# Patient Record
Sex: MALE | Race: BLACK OR AFRICAN AMERICAN | NOT HISPANIC OR LATINO | Employment: FULL TIME | URBAN - METROPOLITAN AREA
[De-identification: names, ages, dates, MRNs, and addresses within clinical notes are randomized per-mention and may not be internally consistent; named-entity substitution may affect disease eponyms.]

---

## 2017-05-31 ENCOUNTER — OFFICE VISIT (OUTPATIENT)
Dept: FAMILY MEDICINE | Facility: CLINIC | Age: 32
End: 2017-05-31
Payer: MEDICAID

## 2017-05-31 VITALS
BODY MASS INDEX: 24.01 KG/M2 | WEIGHT: 153 LBS | SYSTOLIC BLOOD PRESSURE: 108 MMHG | TEMPERATURE: 98 F | DIASTOLIC BLOOD PRESSURE: 68 MMHG | RESPIRATION RATE: 14 BRPM | HEIGHT: 67 IN | HEART RATE: 91 BPM | OXYGEN SATURATION: 99 %

## 2017-05-31 DIAGNOSIS — F43.23 ADJUSTMENT DISORDER WITH MIXED ANXIETY AND DEPRESSED MOOD: Primary | ICD-10-CM

## 2017-05-31 PROCEDURE — 99214 OFFICE O/P EST MOD 30 MIN: CPT | Performed by: PHYSICIAN ASSISTANT

## 2017-05-31 RX ORDER — HYDROXYZINE HYDROCHLORIDE 10 MG/1
10-30 TABLET, FILM COATED ORAL 3 TIMES DAILY PRN
Qty: 90 TABLET | Refills: 0 | Status: SHIPPED | OUTPATIENT
Start: 2017-05-31 | End: 2017-06-17

## 2017-05-31 ASSESSMENT — ANXIETY QUESTIONNAIRES
3. WORRYING TOO MUCH ABOUT DIFFERENT THINGS: NEARLY EVERY DAY
7. FEELING AFRAID AS IF SOMETHING AWFUL MIGHT HAPPEN: MORE THAN HALF THE DAYS
5. BEING SO RESTLESS THAT IT IS HARD TO SIT STILL: NOT AT ALL
6. BECOMING EASILY ANNOYED OR IRRITABLE: NOT AT ALL
1. FEELING NERVOUS, ANXIOUS, OR ON EDGE: NEARLY EVERY DAY
GAD7 TOTAL SCORE: 14
2. NOT BEING ABLE TO STOP OR CONTROL WORRYING: NEARLY EVERY DAY
IF YOU CHECKED OFF ANY PROBLEMS ON THIS QUESTIONNAIRE, HOW DIFFICULT HAVE THESE PROBLEMS MADE IT FOR YOU TO DO YOUR WORK, TAKE CARE OF THINGS AT HOME, OR GET ALONG WITH OTHER PEOPLE: EXTREMELY DIFFICULT

## 2017-05-31 ASSESSMENT — PATIENT HEALTH QUESTIONNAIRE - PHQ9: 5. POOR APPETITE OR OVEREATING: NEARLY EVERY DAY

## 2017-05-31 NOTE — NURSING NOTE
"Chief Complaint   Patient presents with     Depression     Anxiety       Initial /68 (BP Location: Right arm, Patient Position: Chair, Cuff Size: Adult Regular)  Pulse 91  Temp 98  F (36.7  C) (Tympanic)  Resp 14  Ht 5' 7\" (1.702 m)  Wt 153 lb (69.4 kg)  SpO2 99%  BMI 23.96 kg/m2 Estimated body mass index is 23.96 kg/(m^2) as calculated from the following:    Height as of this encounter: 5' 7\" (1.702 m).    Weight as of this encounter: 153 lb (69.4 kg).  Medication Reconciliation: complete    "

## 2017-05-31 NOTE — MR AVS SNAPSHOT
"              After Visit Summary   2017    Mathew Rahman    MRN: [id number]           Patient Information     Date Of Birth          1985        Visit Information        Provider Department      2017 2:50 PM Siegler, Nicole Joy, PA-C [facility]        Today's Diagnoses     Adjustment disorder with mixed anxiety and depressed mood    -  1       Follow-ups after your visit        Who to contact     If you have questions or need follow up information about today's clinic visit or your schedule please contact [facility] directly at [phone].  Normal or non-critical lab and imaging results will be communicated to you by Asthmatxhart, letter or phone within 4 business days after the clinic has received the results. If you do not hear from us within 7 days, please contact the clinic through Asthmatxhart or phone. If you have a critical or abnormal lab result, we will notify you by phone as soon as possible.  Submit refill requests through [employer] or call your pharmacy and they will forward the refill request to us. Please allow 3 business days for your refill to be completed.          Additional Information About Your Visit        MyChart Information     [employer] lets you send messages to your doctor, view your test results, renew your prescriptions, schedule appointments and more. To sign up, go to [email or web address] . Click on \"Log in\" on the left side of the screen, which will take you to the Welcome page. Then click on \"Sign up Now\" on the right side of the page.     You will be asked to enter the access code listed below, as well as some personal information. Please follow the directions to create your username and password.     Your access code is: [email or web address]  Expires: 2017  3:18 PM     Your access code will  in 90 days. If you need help or a new code, please call your [facility] or [phone].        Care EveryWhere ID " "    This is your Care EveryWhere ID. This could be used by other organizations to access your [place] medical records  [id number]        Your Vitals Were     Pulse Temperature Respirations Height Pulse Oximetry BMI (Body Mass Index)    91 98  F (36.7  C) (Tympanic) 14 5' 7\" (1.702 m) 99% 23.96 kg/m2       Blood Pressure from Last 3 Encounters:   05/31/17 108/68   12/28/14 104/64   12/28/14 109/70    Weight from Last 3 Encounters:   05/31/17 153 lb (69.4 kg)   12/28/14 159 lb (72.1 kg)   12/28/14 160 lb (72.6 kg)              Today, you had the following     No orders found for display         Today's Medication Changes          These changes are accurate as of: 5/31/17  3:18 PM.  If you have any questions, ask your nurse or doctor.               Start taking these medicines.        Dose/Directions    hydrOXYzine 10 MG tablet   Commonly known as:  ATARAX   Used for:  Adjustment disorder with mixed anxiety and depressed mood   Started by:  Siegler, Nicole Joy, PA-C        Dose:  10-30 mg   Take 1-3 tablets (10-30 mg) by mouth 3 times daily as needed for anxiety   Quantity:  90 tablet   Refills:  0            Where to get your medicines      These medications were sent to [facility] [id number] [place] [address] [address] AT [facility] [address]  [address] [facility] [address]     Phone:  [phone]     hydrOXYzine 10 MG tablet                Primary Care Provider    None       No address on file        Thank you!     Thank you for choosing [facility]  for your care. Our goal is always to provide you with excellent care. Hearing back from our patients is one way we can continue to improve our services. Please take a few minutes to complete the written survey that you may receive in the mail after your visit with us. Thank you!             Your Updated Medication List - Protect others around you: Learn how to safely use, store and throw away your medicines at " www.disposemymeds.org.          This list is accurate as of: 5/31/17  3:18 PM.  Always use your most recent med list.                   Brand Name Dispense Instructions for use    hydrOXYzine 10 MG tablet    ATARAX    90 tablet    Take 1-3 tablets (10-30 mg) by mouth 3 times daily as needed for anxiety

## 2017-05-31 NOTE — PROGRESS NOTES
"  SUBJECTIVE:                                                    Mathew Rahman is a 31 year old male who presents to clinic today for the following health issues:    Abnormal Mood Symptoms     Onset: 1-2 weeks    Description:   Depression: YES  Anxiety: YES    Accompanying Signs & Symptoms:  Still participating in activities that you used to enjoy: no  Fatigue: YES  Irritability: no  Difficulty concentrating: YES  Changes in appetite: YES- barely eating  Problems with sleep: no  Heart racing/beating fast : YES- \"hurts\"  Thoughts of hurting yourself or others: none     History:   Recent stress: no-patient states that he does take care of his entire family  Prior depression hospitalization: None  Family history of depression: no  Family history of anxiety: no      Precipitating factors:   Alcohol/drug use: no    Alleviating factors:  n/a       Therapies Tried and outcome: None    As above- pt describes a few weeks of worsening anxiety symptoms, depressed mood and stress from family concerns stemming from his sister leaving the family and moving away without word to them 6 months ago. He has since heard from her and known she is safe. He has not discussed this or any other concerns with any member of his family or any friends. He is [employer] and began ramadan fasting this week. He lives in a traditional [employer] family where he, as an adult male, cares for his parents and female siblings until they are . His culture does not allow for open discussion of his emotions with his female siblings or his mother. He feels much stress about this. His symptoms have started to take on a physical nature as well with some sweating and fast heartbeats when he feels very nervous. This has not happened to him in the past.     He has no known medical issues; has been basically healthy thru adulthood.   He does not smoke, does not drink alcohol   He works full time in a relatively stressful job.   No new medications, no use of " "recreational drugs.     Problem list and histories reviewed & adjusted, as indicated.  Additional history: as documented    Patient Active Problem List   Diagnosis     Polyuria     Shortness of breath on exertion     Fatigue     CARDIOVASCULAR SCREENING; LDL GOAL LESS THAN 160     Immunity status testing     Vitamin B12 deficiency without anemia     Vitamin D deficiency     Nasal congestion     Impacted cerumen     Allergic rhinosinusitis     Hyperlipidemia LDL goal <160     History reviewed. No pertinent surgical history.    Social History   Substance Use Topics     Smoking status: Never Smoker     Smokeless tobacco: Never Used     Alcohol use No     Family History   Problem Relation Age of Onset     DIABETES Paternal Grandfather          Current Outpatient Prescriptions   Medication Sig Dispense Refill     hydrOXYzine (ATARAX) 10 MG tablet Take 1-3 tablets (10-30 mg) by mouth 3 times daily as needed for anxiety 90 tablet 0       Reviewed and updated as needed this visit by clinical staff       Reviewed and updated as needed this visit by Provider         ROS:  Constitutional, HEENT, cardiovascular, pulmonary, gi and gu systems are negative, except as otherwise noted.    OBJECTIVE:                                                    /68 (BP Location: Right arm, Patient Position: Chair, Cuff Size: Adult Regular)  Pulse 91  Temp 98  F (36.7  C) (Tympanic)  Resp 14  Ht 5' 7\" (1.702 m)  Wt 153 lb (69.4 kg)  SpO2 99%  BMI 23.96 kg/m2  Body mass index is 23.96 kg/(m^2).   RESP: No labored breathing  SKIN: No diaphoresis, redness, rash or pallor.   Mental Status Exam  Behavior: cooperative and calm  Speech: normal rate and rhythm  Mood: anxious and labile- rotates between anxious and calm quickly  Affect: Tearful, Reactive/Full range and Anxious/Nervous  Thought Processes: Goal directed  Thought Content: no evidence of suicidal or homicidal ideation, no overt psychosis and patient does not appear to be " "responding to internal stimuli  Insight: Fair  Judgment: Adequate for safety      Diagnostic Test Results:  none      ASSESSMENT/PLAN:                                                        ICD-10-CM    1. Adjustment disorder with mixed anxiety and depressed mood F43.23 hydrOXYzine (ATARAX) 10 MG tablet       We discussed the following  - daily medication use; it may be too soon to start him on this as his symptoms seem to have been new onset. During our discussion he shares that the symptoms have been insidious in onset over the past few months and he feels he is at his \"peak\" right now. We discussed daily meds in that they may take 4-8 weeks for full effect and should have a plan to remain on them for 9-12 months to prevent recurrent symptoms. He is not prepared to make that commitment.   - use of hydroxyzine and appropriate dosing of 10-30 mg to find a helpful reduction of anxiety symptoms without overwhelming sleepiness. He agrees to try this and will let me know if there are any concerns or side effects.   - the importance of therapy whether professionally or with a culturally appropriate person. I recommended a male family member, a  or other person he would be comfortable speaking with. His symptoms may be stemming from his culturally significant need to be the \"strong\" person without having anyone to process his emotions with. I agreed that it is not always appropriate to share his emotions with everyone, but everyone should have someone who they can process with and that is sometimes another adult family member or  or family friend.  - if symptoms persist we should do a physical examination and lab testing for other etiologies.      He expressed understanding and agreement with the plan. He will break his ramadan fast to take this medication with a small sip of water over the next few weeks and return as needed for follow up. He will call for referral to therapy/counseling " services should he decide he would like them.     25 total minutes spent with the patient, > 50% face to face discussing the patients concerns and addressing questions in the above assessment and plan.          Nicole Joy Siegler, PA-C  [facility]

## 2017-06-01 ASSESSMENT — PATIENT HEALTH QUESTIONNAIRE - PHQ9: SUM OF ALL RESPONSES TO PHQ QUESTIONS 1-9: 13

## 2017-06-01 ASSESSMENT — ANXIETY QUESTIONNAIRES: GAD7 TOTAL SCORE: 14

## 2017-06-17 ENCOUNTER — APPOINTMENT (OUTPATIENT)
Dept: CT IMAGING | Facility: CLINIC | Age: 32
End: 2017-06-17
Attending: EMERGENCY MEDICINE
Payer: MEDICAID

## 2017-06-17 ENCOUNTER — OFFICE VISIT (OUTPATIENT)
Dept: FAMILY MEDICINE | Facility: CLINIC | Age: 32
End: 2017-06-17
Payer: MEDICAID

## 2017-06-17 ENCOUNTER — HOSPITAL ENCOUNTER (EMERGENCY)
Facility: CLINIC | Age: 32
Discharge: HOME | End: 2017-06-17
Attending: EMERGENCY MEDICINE | Admitting: EMERGENCY MEDICINE
Payer: MEDICAID

## 2017-06-17 VITALS
SYSTOLIC BLOOD PRESSURE: 120 MMHG | BODY MASS INDEX: 23.7 KG/M2 | WEIGHT: 151 LBS | HEIGHT: 67 IN | DIASTOLIC BLOOD PRESSURE: 70 MMHG | HEART RATE: 82 BPM | RESPIRATION RATE: 16 BRPM | TEMPERATURE: 97 F

## 2017-06-17 VITALS
RESPIRATION RATE: 16 BRPM | SYSTOLIC BLOOD PRESSURE: 138 MMHG | HEART RATE: 65 BPM | TEMPERATURE: 97.8 F | DIASTOLIC BLOOD PRESSURE: 83 MMHG | OXYGEN SATURATION: 99 %

## 2017-06-17 DIAGNOSIS — F41.9 ANXIETY: ICD-10-CM

## 2017-06-17 DIAGNOSIS — R10.9 FLANK PAIN: ICD-10-CM

## 2017-06-17 DIAGNOSIS — F41.8 DEPRESSION WITH ANXIETY: Primary | ICD-10-CM

## 2017-06-17 DIAGNOSIS — R51.9 ACUTE NONINTRACTABLE HEADACHE, UNSPECIFIED HEADACHE TYPE: ICD-10-CM

## 2017-06-17 LAB
ALBUMIN SERPL-MCNC: 4.3 G/DL (ref 3.4–5)
ALBUMIN UR-MCNC: NEGATIVE MG/DL
ALP SERPL-CCNC: 89 U/L (ref 40–150)
ALT SERPL W P-5'-P-CCNC: 39 U/L (ref 0–70)
ANION GAP SERPL CALCULATED.3IONS-SCNC: 9 MMOL/L (ref 3–14)
APPEARANCE UR: CLEAR
AST SERPL W P-5'-P-CCNC: 38 U/L (ref 0–45)
BILIRUB SERPL-MCNC: 0.8 MG/DL (ref 0.2–1.3)
BILIRUB UR QL STRIP: NEGATIVE
BUN SERPL-MCNC: 12 MG/DL (ref 7–30)
CALCIUM SERPL-MCNC: 9.4 MG/DL (ref 8.5–10.1)
CHLORIDE SERPL-SCNC: 106 MMOL/L (ref 94–109)
CO2 SERPL-SCNC: 25 MMOL/L (ref 20–32)
COLOR UR AUTO: YELLOW
CREAT SERPL-MCNC: 0.88 MG/DL (ref 0.66–1.25)
GFR SERPL CREATININE-BSD FRML MDRD: ABNORMAL ML/MIN/1.7M2
GLUCOSE SERPL-MCNC: 109 MG/DL (ref 70–99)
GLUCOSE UR STRIP-MCNC: NEGATIVE MG/DL
HGB UR QL STRIP: NEGATIVE
KETONES UR STRIP-MCNC: NEGATIVE MG/DL
LEUKOCYTE ESTERASE UR QL STRIP: NEGATIVE
NITRATE UR QL: NEGATIVE
NON-SQ EPI CELLS #/AREA URNS LPF: NORMAL /LPF
PH UR STRIP: 5.5 PH (ref 5–7)
POTASSIUM SERPL-SCNC: 4 MMOL/L (ref 3.4–5.3)
PROT SERPL-MCNC: 7.9 G/DL (ref 6.8–8.8)
RBC #/AREA URNS AUTO: NORMAL /HPF (ref 0–2)
SODIUM SERPL-SCNC: 140 MMOL/L (ref 133–144)
SP GR UR STRIP: >1.03 (ref 1–1.03)
URN SPEC COLLECT METH UR: NORMAL
UROBILINOGEN UR STRIP-ACNC: 1 EU/DL (ref 0.2–1)
WBC #/AREA URNS AUTO: NORMAL /HPF (ref 0–2)

## 2017-06-17 PROCEDURE — 99214 OFFICE O/P EST MOD 30 MIN: CPT | Performed by: FAMILY MEDICINE

## 2017-06-17 PROCEDURE — 81001 URINALYSIS AUTO W/SCOPE: CPT | Performed by: FAMILY MEDICINE

## 2017-06-17 PROCEDURE — 70450 CT HEAD/BRAIN W/O DYE: CPT

## 2017-06-17 PROCEDURE — 36415 COLL VENOUS BLD VENIPUNCTURE: CPT | Performed by: FAMILY MEDICINE

## 2017-06-17 PROCEDURE — 80053 COMPREHEN METABOLIC PANEL: CPT | Performed by: FAMILY MEDICINE

## 2017-06-17 PROCEDURE — 99284 EMERGENCY DEPT VISIT MOD MDM: CPT | Mod: 25

## 2017-06-17 ASSESSMENT — ANXIETY QUESTIONNAIRES
6. BECOMING EASILY ANNOYED OR IRRITABLE: NEARLY EVERY DAY
3. WORRYING TOO MUCH ABOUT DIFFERENT THINGS: NEARLY EVERY DAY
GAD7 TOTAL SCORE: 19
IF YOU CHECKED OFF ANY PROBLEMS ON THIS QUESTIONNAIRE, HOW DIFFICULT HAVE THESE PROBLEMS MADE IT FOR YOU TO DO YOUR WORK, TAKE CARE OF THINGS AT HOME, OR GET ALONG WITH OTHER PEOPLE: VERY DIFFICULT
5. BEING SO RESTLESS THAT IT IS HARD TO SIT STILL: SEVERAL DAYS
1. FEELING NERVOUS, ANXIOUS, OR ON EDGE: NEARLY EVERY DAY
2. NOT BEING ABLE TO STOP OR CONTROL WORRYING: NEARLY EVERY DAY
7. FEELING AFRAID AS IF SOMETHING AWFUL MIGHT HAPPEN: NEARLY EVERY DAY

## 2017-06-17 ASSESSMENT — PATIENT HEALTH QUESTIONNAIRE - PHQ9: 5. POOR APPETITE OR OVEREATING: NEARLY EVERY DAY

## 2017-06-17 NOTE — MR AVS SNAPSHOT
"              After Visit Summary   6/17/2017    Mathew Rahman    MRN: [id number]           Patient Information     Date Of Birth          1985        Visit Information        Provider Department      6/17/2017 8:00 AM Ankit Wang MD [facility]        Today's Diagnoses     Depression with anxiety    -  1    Flank pain          Care Instructions    Work to do relaxation techniques            Follow-ups after your visit        Follow-up notes from your care team     Return in about 2 weeks (around 7/1/2017).      Who to contact     If you have questions or need follow up information about today's clinic visit or your schedule please contact [facility] directly at [phone].  Normal or non-critical lab and imaging results will be communicated to you by angelMDhart, letter or phone within 4 business days after the clinic has received the results. If you do not hear from us within 7 days, please contact the clinic through angelMDhart or phone. If you have a critical or abnormal lab result, we will notify you by phone as soon as possible.  Submit refill requests through [employer] or call your pharmacy and they will forward the refill request to us. Please allow 3 business days for your refill to be completed.          Additional Information About Your Visit        angelMDhart Information     [employer] lets you send messages to your doctor, view your test results, renew your prescriptions, schedule appointments and more. To sign up, go to [email or web address] . Click on \"Log in\" on the left side of the screen, which will take you to the Welcome page. Then click on \"Sign up Now\" on the right side of the page.     You will be asked to enter the access code listed below, as well as some personal information. Please follow the directions to create your username and password.     Your access code is: [email or web address]  Expires: 8/29/2017  3:18 PM     Your access code " "will  in 90 days. If you need help or a new code, please call your [place] clinic or [phone].        Care EveryWhere ID     This is your Care EveryWhere ID. This could be used by other organizations to access your [place] medical records  [id number]        Your Vitals Were     Pulse Temperature Respirations Height BMI (Body Mass Index)       82 97  F (36.1  C) (Tympanic) 16 5' 7\" (1.702 m) 23.65 kg/m2        Blood Pressure from Last 3 Encounters:   17 120/70   17 108/68   14 104/64    Weight from Last 3 Encounters:   17 151 lb (68.5 kg)   17 153 lb (69.4 kg)   14 159 lb (72.1 kg)              We Performed the Following     Comprehensive metabolic panel (BMP + Alb, Alk Phos, ALT, AST, Total. Bili, TP)     UA with Microscopic          Today's Medication Changes          These changes are accurate as of: 17  8:12 AM.  If you have any questions, ask your nurse or doctor.               Stop taking these medicines if you haven't already. Please contact your care team if you have questions.     hydrOXYzine 10 MG tablet   Commonly known as:  ATARAX   Stopped by:  Ankit Wang MD                    Primary Care Provider    None       No address on file        Thank you!     Thank you for choosing [facility]  for your care. Our goal is always to provide you with excellent care. Hearing back from our patients is one way we can continue to improve our services. Please take a few minutes to complete the written survey that you may receive in the mail after your visit with us. Thank you!             Your Updated Medication List - Protect others around you: Learn how to safely use, store and throw away your medicines at www.disposemymeds.org.      Notice  As of 2017  8:12 AM    You have not been prescribed any medications.      "

## 2017-06-17 NOTE — PROGRESS NOTES
"  SUBJECTIVE:                                                    Mathew Rahman is a 31 year old male who presents to clinic today for the following health issues:        Depression and Anxiety Follow-Up    Status since last visit: Worsened     Other associated symptoms: flank pain, malaise    Complicating factors:     Significant life event: No     Current substance abuse: None    PHQ-9 SCORE 5/31/2017   Total Score 13     KENTRELL-7 SCORE 5/31/2017   Total Score 14        PHQ-9  English      PHQ-9   Any Language     GAD7   He took the Hydroxyzine for a week, thought that it helped at first but then felt worse so he stopped taking medication.  He is leery of taking anything else at this time    Ramadan is continuing, lasts until next Saturday      Amount of exercise or physical activity: 2-3 days/week for an average of 15-30 minutes- just started this week    Problems taking medications regularly: No    Medication side effects: feels hydroxyzine made him feel worse    Diet: regular (no restrictions)          Problem list and histories reviewed & adjusted, as indicated.  Additional history: as documented    Labs reviewed in EPIC    Reviewed and updated as needed this visit by clinical staff       Reviewed and updated as needed this visit by Provider         ROS:  CONSTITUTIONAL:NEGATIVE for fever, chills, change in weight  RESP:NEGATIVE for significant cough or SOB  CV: NEGATIVE for chest pain, palpitations or peripheral edema  GI: POSITIVE for nausea  MUSCULOSKELETAL: POSITIVE  for back pain mid back, no CVA tenderness  PSYCHIATRIC: POSITIVE foranxiety and depressed mood    OBJECTIVE:                                                    /70  Pulse 82  Temp 97  F (36.1  C) (Tympanic)  Resp 16  Ht 5' 7\" (1.702 m)  Wt 151 lb (68.5 kg)  BMI 23.65 kg/m2  Body mass index is 23.65 kg/(m^2).  GENERAL APPEARANCE: healthy, alert and no distress  RESP: lungs clear to auscultation - no rales, rhonchi or wheezes  CV: " regular rates and rhythm, normal S1 S2, no S3 or S4 and no murmur, click or rub  ABDOMEN: soft, nontender, without hepatosplenomegaly or masses and bowel sounds normal  MS: extremities normal- no gross deformities noted and no CVA tenderness but paraspinal muscles are tight  PSYCH: mentation appears normal, affect flat and anxious    Diagnostic test results:  Lab: see below, results pending     ASSESSMENT/PLAN:                                                        ICD-10-CM    1. Depression with anxiety F41.8    2. Flank pain R10.9 Comprehensive metabolic panel (BMP + Alb, Alk Phos, ALT, AST, Total. Bili, TP)     UA with Microscopic       Follow up with Provider - 2-3 weeks if not improving  Pt does not want to start other medication at this time.  Discussed referring to mental health for counseling.  He does not want to do that at this time.  When Ramadan is completed he will be able to get back to regular schedule for eating and drinking fluids   Patient Instructions   Work to do relaxation techniques        Ankit Wang MD  [facility]

## 2017-06-17 NOTE — NURSING NOTE
"Chief Complaint   Patient presents with     Anxiety     Depression       Initial /70  Pulse 82  Temp 97  F (36.1  C) (Tympanic)  Resp 16  Ht 5' 7\" (1.702 m)  Wt 151 lb (68.5 kg)  BMI 23.65 kg/m2 Estimated body mass index is 23.65 kg/(m^2) as calculated from the following:    Height as of this encounter: 5' 7\" (1.702 m).    Weight as of this encounter: 151 lb (68.5 kg).  Medication Reconciliation: complete     Ernestina Bonner CMA      "

## 2017-06-17 NOTE — ED AVS SNAPSHOT
[facility] Emergency Department    [address]    [facility] [address]    Phone:  [phone]    Fax:  [phone]                                       Mathew Rahman   MRN: [id number]    Department:  [facility] Emergency Department   Date of Visit:  6/17/2017           After Visit Summary Signature Page     I have received my discharge instructions, and my questions have been answered. I have discussed any challenges I see with this plan with the nurse or doctor.    ..........................................................................................................................................  Patient/Patient Representative Signature      ..........................................................................................................................................  Patient Representative Print Name and Relationship to Patient    ..................................................               ................................................  Date                                            Time    ..........................................................................................................................................  Reviewed by Signature/Title    ...................................................              ..............................................  Date                                                            Time

## 2017-06-17 NOTE — LETTER
[facility]  [address]  [address]  [facility] [id number] [phone]                                                                                                           Mathew Rahman  [address]  [facility] [address]    June 19, 2017      Dear Mathew,    The results of your recent tests were reviewed and are enclosed.     Metabolic panel shows mild elevation of glucose, not significant.  Keep carbohydrate intake controlled  Urine test is normal  Results for orders placed or performed in visit on 06/17/17   Comprehensive metabolic panel (BMP + Alb, Alk Phos, ALT, AST, Total. Bili, TP)   Result Value Ref Range    Sodium 140 133 - 144 mmol/L    Potassium 4.0 3.4 - 5.3 mmol/L    Chloride 106 94 - 109 mmol/L    Carbon Dioxide 25 20 - 32 mmol/L    Anion Gap 9 3 - 14 mmol/L    Glucose 109 (H) 70 - 99 mg/dL    Urea Nitrogen 12 7 - 30 mg/dL    Creatinine 0.88 0.66 - 1.25 mg/dL    GFR Estimate >90  Non  GFR Calc   >60 mL/min/1.7m2    GFR Estimate If Black >90   GFR Calc   >60 mL/min/1.7m2    Calcium 9.4 8.5 - 10.1 mg/dL    Bilirubin Total 0.8 0.2 - 1.3 mg/dL    Albumin 4.3 3.4 - 5.0 g/dL    Protein Total 7.9 6.8 - 8.8 g/dL    Alkaline Phosphatase 89 40 - 150 U/L    ALT 39 0 - 70 U/L    AST 38 0 - 45 U/L   UA with Microscopic   Result Value Ref Range    Color Urine Yellow     Appearance Urine Clear     Glucose Urine Negative NEG mg/dL    Bilirubin Urine Negative NEG    Ketones Urine Negative NEG mg/dL    Specific Gravity Urine >1.030 1.003 - 1.035    pH Urine 5.5 5.0 - 7.0 pH    Protein Albumin Urine Negative NEG mg/dL    Urobilinogen Urine 1.0 0.2 - 1.0 EU/dL    Nitrite Urine Negative NEG    Blood Urine Negative NEG    Leukocyte Esterase Urine Negative NEG    Source Midstream Urine     WBC Urine O - 2 0 - 2 /HPF    RBC Urine O - 2 0 - 2 /HPF    Squamous Epithelial /LPF Urine Few FEW /LPF         Thank you for choosing  [facility].  We appreciate the opportunity to serve you and look forward to supporting your healthcare needs in the future.    If you have any questions or concerns, please call me or my staff at [phone].      Sincerely,    Ankit Wang MD

## 2017-06-17 NOTE — ED AVS SNAPSHOT
[facility] Emergency Department    [address] [place] [address]    Phone:  [phone]    Fax:  [phone]                                       Mathew Rahman   MRN: [id number]    Department:  [facility] Emergency Department   Date of Visit:  6/17/2017           Patient Information     Date Of Birth          1985        Your diagnoses for this visit were:     Acute nonintractable headache, unspecified headache type     Anxiety        You were seen by Concepción Gutierrez MD.      Follow-up Information     Follow up with In clinic In 1 week.    Why:  As needed        Discharge Instructions       Start taking melatonin 30 minutes before bed.  Doses between 1-5mg are common - frequently people start with 3mg.    Discharge Instructions  Headache    You were seen today for a headache. Headaches may be caused by many different things such as muscle tension, sinus inflammation, anxiety and stress, having too little sleep, too much alcohol, some medical conditions or injury. You may have a migraine, which is caused by changes in the blood vessels in your head.  At this time your doctor does not find that your headache is a sign of anything dangerous or life-threatening.  However, sometimes the signs of serious illness do not show up right away.  If you have new or worse symptoms, you may need to be seen again in the emergency department or by your primary doctor.      Return to the Emergency Department if:    You get a fever of 101 F or higher.    Your headache gets much worse.    You get a stiff neck with your headache.    You get a new headache that is different or worse than headaches you have had before.    You are vomiting and can t keep food or water down.    You have blurry or double vision or other problems with your eyes.    You have a new weakness on one side of your body.    You have difficulty with balance which is new.    You or your family thinks  you are confused.    You have a seizure or convulsion.    What can I do to help myself?    Pain medications - You may take a pain medication such as Tylenol  (acetaminophen), Advil , Nuprin  (ibuprofen) or Aleve  (naproxen).  If you have been given a narcotic such as Vicodin  (hydrocodone with acetaminophen), Percocet  (oxycodone with acetaminophen), codeine, or a muscle relaxant such as Flexeril  (cyclobenzaprine) or Soma  (carisoprodol), do not drive for four hours after you have taken it. If the narcotic contains Tylenol  (acetaminophen), do not take Tylenol  with it. All narcotics will cause constipation, so eat a high fiber diet.        Take a pain reliever as soon as you notice symptoms.  Starting medications as soon as you start to have symptoms may lessen the amount of pain you have.    Relaxing in a quiet, dark room may help.    Get enough sleep and eat meals regularly.    Schedule an appointment with your primary physician as instructed, or at least within 1 week.    You may need to watch for certain foods or other things which may trigger your headaches.  Keeping a journal of your headaches and possible triggers may help you and your primary doctor to identify things which you should avoid which may be causing your headaches.  If you were given a prescription for medicine here today, be sure to read all of the information (including the package insert) that comes with your prescription.  This will include important information about the medicine, its side effects, and any warnings that you need to know about.  The pharmacist who fills the prescription can provide more information and answer questions you may have about the medicine.  If you have questions or concerns that the pharmacist cannot address, please call or return to the Emergency Department.           24 Hour Appointment Hotline       To make an appointment at any [facility], call [phone] ([phone]). If you don't have a family  doctor or clinic, we will help you find one. [facility] are conveniently located to serve the needs of you and your family.             Review of your medicines      Notice     You have not been prescribed any medications.            Procedures and tests performed during your visit     Head CT w/o contrast      Orders Needing Specimen Collection     None      Pending Results     No orders found from 6/15/2017 to 6/18/2017.            Pending Culture Results     No orders found from 6/15/2017 to 6/18/2017.            Pending Results Instructions     If you had any lab results that were not finalized at the time of your Discharge, you can call the ED Lab Result RN at [phone]. You will be contacted by this team for any positive Lab results or changes in treatment. The nurses are available 7 days a week from 10A to 6:30P.  You can leave a message 24 hours per day and they will return your call.        Test Results From Your Hospital Stay        6/17/2017 11:21 PM      Narrative     CT HEAD W/O CONTRAST 6/17/2017 11:10 PM    HISTORY: Posterior headache.    COMPARISON: None.    TECHNIQUE: Noncontrast head CT.  Radiation dose for this scan was  reduced using automated exposure control, adjustment of the mA and/or  kV according to patient size, or iterative reconstruction technique.    FINDINGS: No intracranial hemorrhage. No abnormal extra-axial fluid  collection. Midline is maintained. Ventricular volumes are normal. No  evidence of mass or infarct. Calvarium is intact. Sinuses and mastoid  air cells are normally aerated.        Impression     IMPRESSION: Normal head CT.    GEOVANNY KO MD                Clinical Quality Measure: Blood Pressure Screening     Your blood pressure was checked while you were in the emergency department today. The last reading we obtained was  BP: 138/83 . Please read the guidelines below about what these numbers mean and what you should do about them.  If your systolic blood  "pressure (the top number) is less than 120 and your diastolic blood pressure (the bottom number) is less than 80, then your blood pressure is normal. There is nothing more that you need to do about it.  If your systolic blood pressure (the top number) is 120-139 or your diastolic blood pressure (the bottom number) is 80-89, your blood pressure may be higher than it should be. You should have your blood pressure rechecked within a year by a primary care provider.  If your systolic blood pressure (the top number) is 140 or greater or your diastolic blood pressure (the bottom number) is 90 or greater, you may have high blood pressure. High blood pressure is treatable, but if left untreated over time it can put you at risk for heart attack, stroke, or kidney failure. You should have your blood pressure rechecked by a primary care provider within the next 4 weeks.  If your provider in the emergency department today gave you specific instructions to follow-up with your doctor or provider even sooner than that, you should follow that instruction and not wait for up to 4 weeks for your follow-up visit.        Thank you for choosing [facility]       Thank you for choosing [facility] for your care. Our goal is always to provide you with excellent care. Hearing back from our patients is one way we can continue to improve our services. Please take a few minutes to complete the written survey that you may receive in the mail after you visit with us. Thank you!        [employer] Information     [employer] lets you send messages to your doctor, view your test results, renew your prescriptions, schedule appointments and more. To sign up, go to [email or web address] . Click on \"Log in\" on the left side of the screen, which will take you to the Welcome page. Then click on \"Sign up Now\" on the right side of the page.     You will be asked to enter the access code listed below, as well as some personal information. Please follow the " directions to create your username and password.     Your access code is: [email or web address]  Expires: 2017  3:18 PM     Your access code will  in 90 days. If you need help or a new code, please call your [place] clinic or [phone].        Care EveryWhere ID     This is your Care EveryWhere ID. This could be used by other organizations to access your [place] medical records  [id number]        After Visit Summary       This is your record. Keep this with you and show to your community pharmacist(s) and doctor(s) at your next visit.

## 2017-06-18 ASSESSMENT — PATIENT HEALTH QUESTIONNAIRE - PHQ9: SUM OF ALL RESPONSES TO PHQ QUESTIONS 1-9: 18

## 2017-06-18 ASSESSMENT — ANXIETY QUESTIONNAIRES: GAD7 TOTAL SCORE: 19

## 2017-06-18 NOTE — DISCHARGE INSTRUCTIONS
Start taking melatonin 30 minutes before bed.  Doses between 1-5mg are common - frequently people start with 3mg.    Discharge Instructions  Headache    You were seen today for a headache. Headaches may be caused by many different things such as muscle tension, sinus inflammation, anxiety and stress, having too little sleep, too much alcohol, some medical conditions or injury. You may have a migraine, which is caused by changes in the blood vessels in your head.  At this time your doctor does not find that your headache is a sign of anything dangerous or life-threatening.  However, sometimes the signs of serious illness do not show up right away.  If you have new or worse symptoms, you may need to be seen again in the emergency department or by your primary doctor.      Return to the Emergency Department if:    You get a fever of 101 F or higher.    Your headache gets much worse.    You get a stiff neck with your headache.    You get a new headache that is different or worse than headaches you have had before.    You are vomiting and can t keep food or water down.    You have blurry or double vision or other problems with your eyes.    You have a new weakness on one side of your body.    You have difficulty with balance which is new.    You or your family thinks you are confused.    You have a seizure or convulsion.    What can I do to help myself?    Pain medications - You may take a pain medication such as Tylenol  (acetaminophen), Advil , Nuprin  (ibuprofen) or Aleve  (naproxen).  If you have been given a narcotic such as Vicodin  (hydrocodone with acetaminophen), Percocet  (oxycodone with acetaminophen), codeine, or a muscle relaxant such as Flexeril  (cyclobenzaprine) or Soma  (carisoprodol), do not drive for four hours after you have taken it. If the narcotic contains Tylenol  (acetaminophen), do not take Tylenol  with it. All narcotics will cause constipation, so eat a high fiber diet.        Take a pain  reliever as soon as you notice symptoms.  Starting medications as soon as you start to have symptoms may lessen the amount of pain you have.    Relaxing in a quiet, dark room may help.    Get enough sleep and eat meals regularly.    Schedule an appointment with your primary physician as instructed, or at least within 1 week.    You may need to watch for certain foods or other things which may trigger your headaches.  Keeping a journal of your headaches and possible triggers may help you and your primary doctor to identify things which you should avoid which may be causing your headaches.  If you were given a prescription for medicine here today, be sure to read all of the information (including the package insert) that comes with your prescription.  This will include important information about the medicine, its side effects, and any warnings that you need to know about.  The pharmacist who fills the prescription can provide more information and answer questions you may have about the medicine.  If you have questions or concerns that the pharmacist cannot address, please call or return to the Emergency Department.

## 2017-06-18 NOTE — ED PROVIDER NOTES
History     Chief Complaint:  Headache; Dizziness; and Anxiety      HPI   Mathew Rahman is a 31 year old male who presents with continued dizziness and anxiety and new onset headache.  First noticed it last night when he couldn't sleep.  Posterior in location.  However this evening it escalated abruptly to 10/10 and wrapped around and was associated with dizziness and blurred vision.  Took aleve at onset of headache but none at time of worsening.  Now 3/10 since resting in room.  Declined offer for medication for symptoms.  No fever, nausea, speech changes, arm/leg weakness.  Was seen in clinic today for ongoing issues with anxiety.  Had tried hydroxyzine but stopped it and did not want to try another medication at this time.  Stopped fasting for Ramadan due to concern of his current symptoms today.  Born in [place] [facility], moved at age 9    Allergies:  No Known Allergies     Medications:    None    Past Medical History:    Fatigue  Hyperlipidemia    Past Surgical History:    History reviewed. No pertinent surgical history.    Family History:    Family History   Problem Relation Age of Onset     DIABETES Paternal Grandfather        Social History:  Marital Status:  Single [1]  Social History   Substance Use Topics     Smoking status: Never Smoker     Smokeless tobacco: Never Used     Alcohol use No        Review of Systems  Ten system ROS reviewed and is negative except as above      Physical Exam   First Vitals:  BP: 138/83  Pulse: 65  Temp: 97.8  F (36.6  C)  Resp: 16  SpO2: 99 %    Physical Exam  General: Cooperative, appears worried  Head:  The scalp, face, and head appear normal and symmetric  Eyes:  Sclera white; PERRL; EOMI w/o nystagmus  ENT:    External ears and nares normal  Neck:  No meningismus or bruit  CV:  Regular rate and rhythm  No murmur   Resp:  Breath sounds clear and equal bilaterally  GI:  Abdomen is soft, non-tender, non-distended  MS:  Moves all extremities  Neuro: Speech is normal and fluent. No  apparent deficit    Strength 5/5 x4.  Sensation intact x4.      Cranial nerves II-XII intact by examination.    Normal gait, tandem, Rhomberg        Emergency Department Course     Imaging Studies:  Head CT w/o contrast   Final Result   IMPRESSION: Normal head CT.      GEOVANNY KO MD            Impression & Plan      Medical Decision Making:  Mathew Rahman is a 31 year old male is here for evaluation of a new headache that acutely worsened severely just prior to arrival.  DDX includes intracranial mass and bleed and other mass lesions such as neurocystercosis.  CT negative.  Given acuity of presentation after most severe aspect of headache and remaining exam, LP will not be pursued.  Recent anxiety and insomnia may be contributing.  He asked about more natural options for treatment.  On chart review he was recently on hydroxyzine.  Recommended exercise and melatonin.  F/u clinic.      Diagnosis:    ICD-10-CM    1. Acute nonintractable headache, unspecified headache type R51    2. Anxiety F41.9        Concepción Gutierrez MD  6/17/2017   [facility] EMERGENCY DEPARTMENT       Concepción Gutierrez MD  06/18/17 0148

## 2017-06-18 NOTE — ED NOTES
"Pt presents to the ER for evaluation of headache, dizziness, and anxiety. He has been dealing with anxiety and depression lately. Pt stopped hydroxyzine last week, which was prescribed for anxiety, because he \"didn't want to get addicted to it.\" Pt started having a headache yesterday, along with dizziness and blurred vision, which has gradually increased in severity. He now rates his pain 10/10. Pt is alert and oriented. ABCDs intact.  "

## 2023-02-20 ENCOUNTER — APPOINTMENT (OUTPATIENT)
Dept: CT IMAGING | Facility: CLINIC | Age: 38
End: 2023-02-20
Attending: FAMILY MEDICINE
Payer: COMMERCIAL

## 2023-02-20 ENCOUNTER — HOSPITAL ENCOUNTER (EMERGENCY)
Facility: CLINIC | Age: 38
Discharge: HOME | End: 2023-02-20
Attending: EMERGENCY MEDICINE | Admitting: EMERGENCY MEDICINE
Payer: COMMERCIAL

## 2023-02-20 VITALS
SYSTOLIC BLOOD PRESSURE: 120 MMHG | HEIGHT: 67 IN | WEIGHT: 164.4 LBS | HEART RATE: 71 BPM | RESPIRATION RATE: 16 BRPM | TEMPERATURE: 98.3 F | OXYGEN SATURATION: 97 % | BODY MASS INDEX: 25.8 KG/M2 | DIASTOLIC BLOOD PRESSURE: 73 MMHG

## 2023-02-20 DIAGNOSIS — S09.90XA HEAD INJURY, INITIAL ENCOUNTER: ICD-10-CM

## 2023-02-20 PROCEDURE — 99282 EMERGENCY DEPT VISIT SF MDM: CPT | Performed by: EMERGENCY MEDICINE

## 2023-02-20 PROCEDURE — 70450 CT HEAD/BRAIN W/O DYE: CPT

## 2023-02-20 PROCEDURE — 99284 EMERGENCY DEPT VISIT MOD MDM: CPT | Mod: 25 | Performed by: EMERGENCY MEDICINE

## 2023-02-20 ASSESSMENT — ACTIVITIES OF DAILY LIVING (ADL): ADLS_ACUITY_SCORE: 33

## 2023-02-20 NOTE — ED PROVIDER NOTES
"    [facility] EMERGENCY DEPARTMENT ([facility])    2/20/23      ED PROVIDER NOTE  Consult Room 4:51 PM   History     Chief Complaint   Patient presents with     Fall     Headache     The history is provided by the patient and medical records.     Mathew Rahman is a 37 year old male who presents for evaluation after mechanical slip and fall with head injury.  Patient slipped on some ice this morning around 9:30 AM, falling backwards, hitting his head on his car and his buttocks on the ground. He was able to get up right away. His vision seemed white and blurry afterwards but then this subsided.  He endorses headache. He had some neck pain immediately but this subsided. No back pain.  No nausea or vomiting. No blurry vision, dizziness, shortness of breath currently.  No focal neurologic complaints.  He did take Tylenol for this headache prior to arrival. No numbness, weakness, tingling in arms or legs. No unilateral weakness. No change in speech. No injuries elsewhere, The Tylenol and tea helped. He drove himself here. No allergies. Not on any medications. He notes phonophobia and even speaking in a loud voice makes his head hurt. He is otherwise healthy.     Past Medical History  Past Medical History:   Diagnosis Date     Fatigue 1/24/2014     No past surgical history on file.  No current outpatient medications on file.    No Known Allergies  Family History  Family History   Problem Relation Age of Onset     Diabetes Paternal Grandfather      Social History   Social History     Tobacco Use     Smoking status: Never     Smokeless tobacco: Never   Substance Use Topics     Alcohol use: No     Drug use: No         A medically appropriate review of systems was performed with pertinent positives and negatives noted in the HPI, and all other systems negative.    Physical Exam   BP: 120/73  Pulse: 71  Temp: 98.3  F (36.8  C)  Resp: 16  Height: 170.2 cm (5' 7\")  Weight: 74.6 kg (164 lb 6.4 oz)  SpO2: 97 %  Physical " Exam  General: Alert, sitting on the bed in NAD  Neuro: Awake alert &O x 3 CN 2-12 intact; no pronator drift present; strength 5/5 at elbow flexion; strength 5/5 at elbow extension with good  strength bilaterally; strength 5/5 at ankle dorsiflexion; strength 5/5 at ankle plantar flexion; tandem gait is in tact; finger-to-nose is intact and symmetrical; sensation is  intact to light touch throughout; \  Head: atraumatic, no maxillary no frontal sinus tenderness  Nose:  no rhinnhorea  Eyes: Pupils 3mm equal round and reactive to light  Mouth: mmm  Neck: no carotid bruits  Cardiovascular: regular; S1/S2 ; radial pulses equal and symmetrical  Lungs: CTAB bilaterally  Abdomen:  soft non-tender +BS      ED Course, Procedures, & Data           Results for orders placed or performed during the hospital encounter of 02/20/23   CT Head w/o Contrast     Status: None    Narrative    CT OF THE HEAD WITHOUT CONTRAST 2/20/2023 3:34 PM     COMPARISON: Head CT 6/17/2017    HISTORY: Fall.    TECHNIQUE: Axial CT images of the head from the skull base to the  vertex were acquired without IV contrast.    FINDINGS: The ventricles and basal cisterns are within normal limits  in configuration. There is no midline shift. There are no extra-axial  fluid collections. Gray-white differentiation is well maintained.     No intracranial hemorrhage, mass or recent infarct.    The visualized paranasal sinuses are well-aerated. There is no  mastoiditis. There are no fractures of the visualized bones.       Impression    IMPRESSION: Normal head CT.       Radiation dose for this scan was reduced using automated exposure  control, adjustment of the mA and/or kV according to patient size, or  iterative reconstruction technique    BAKARI MEYER MD         SYSTEM ID:  [id number]     Medications - No data to display  Labs Ordered and Resulted from Time of ED Arrival to Time of ED Departure - No data to display  CT Head w/o Contrast   Final Result    IMPRESSION: Normal head CT.          Radiation dose for this scan was reduced using automated exposure   control, adjustment of the mA and/or kV according to patient size, or   iterative reconstruction technique      BAKARI MEYER MD            SYSTEM ID:  [id number]                 Medical Decision Making  The patient's presentation was of straightforward complexity (a clearly self-limited or minor problem).    The patient's evaluation involved:  ordering and/or review of 1 test(s) in this encounter (see separate area of note for details)    The patient's management necessitated only low risk treatment.    Mathew Rahman is a 37 year old old male presenting with a head injury. Nursing notes were reviewed and past medical records were reviewed. Diff dx includes ICH, skull fracture, contusion, and concussion.   Soon after arrival, patient was evaluated and given blurry vision, and significant headache, decision was made to order head CT.  Head CT showed no evidence of skull fracture or intracranial hemorrhage.  Patient was feeling much better in the emergency department.      I went over the concerning signs and symptoms to watch for over the next 24 hours which would require a prompt return for further evaluation including: severe headache persistent nausea/vomitting not acting right new weakness in any extremity facial asymetry or speech dificulties.     Patient discharged in good condition with questions answered. He was given Psychiatric discharge instructions and follow-up recommendations. Patient will return to the ED if symptoms worsen or he develops any of the concerning signs/symptoms as outlined in the EPIC d/c instructions. Otherwise he will follow up in clinic as recommended in the d/c instructions.          Assessment & Plan    (S09.90XA) Head injury, initial encounter      Plan:  Advised patient to return if he develops blurry vision, unilateral weakness  Advised taking ibuprofen and Tylenol alternating  for symptoms.   Follow-up with primary care doctor.    I have reviewed the nursing notes. I have reviewed the findings, diagnosis, plan and need for follow up with the patient.    There are no discharge medications for this patient.      Final diagnoses:   Head injury, initial encounter       I, Joana Granado, am serving as a trained medical scribe to document services personally performed by Faviola Mcpherson MD based on the provider's statements to me on February 20, 2023.  This document has been checked and approved by the attending provider.    I, Faviola Mcpherson MD, was physically present and have reviewed and verified the accuracy of this note documented by Joana Granado, medical scribe.      Faviola Mcpherson MD     [facility] EMERGENCY DEPARTMENT  2/20/2023     Faviola Mcpherson MD  02/22/23 [id number]

## 2023-02-20 NOTE — ED TRIAGE NOTES
"Pt reports he slipped on the ice this morning around 0930, hit his head on the lower part of his car. Pt slipped backwards landed on his butt on the ground while the back of his head hit the car bumper area. Pt reports afterwards he saw blurry vision for a couple seconds, but then immediately went away.     Pt has family history of issues after a fall and his family was concerned for him so wanted him to get checked out.  Pt is awake and alert, calm and cooperative, no blurry vision, dizziness or shortness of breath. Pt reports he feels like he has a headache and feels like his eyes \"seem heavy\". Pt took tylenol at home.    "

## 2023-02-20 NOTE — DISCHARGE INSTRUCTIONS
Thank you for choosing [facility] for your care. It was a pleasure taking care of you today in our Emergency Department.     Please follow up with your primary care provider in the next 5-7 days if symptoms don't improve. However, if you have continued worsening symptoms, please return to the emergency department.